# Patient Record
Sex: MALE
[De-identification: names, ages, dates, MRNs, and addresses within clinical notes are randomized per-mention and may not be internally consistent; named-entity substitution may affect disease eponyms.]

---

## 2016-12-30 LAB — HGB BLD-MCNC: 16.5 GM/DL (ref 13–17)

## 2017-01-10 ENCOUNTER — CHARTING TRANS (OUTPATIENT)
Dept: OTHER | Age: 57
End: 2017-01-10

## 2017-01-10 ENCOUNTER — HOSPITAL (OUTPATIENT)
Dept: OTHER | Age: 57
End: 2017-01-10
Attending: ORTHOPAEDIC SURGERY

## 2017-01-11 ENCOUNTER — CHARTING TRANS (OUTPATIENT)
Dept: OTHER | Age: 57
End: 2017-01-11

## 2017-01-20 PROBLEM — Z98.890 S/P LUMBAR MICRODISCECTOMY: Status: ACTIVE | Noted: 2017-01-20

## 2017-02-14 PROBLEM — M51.26 LUMBAR DISC HERNIATION: Status: ACTIVE | Noted: 2017-02-14

## 2021-05-16 ENCOUNTER — HOSPITAL ENCOUNTER (OUTPATIENT)
Age: 61
Discharge: ANOTHER HOSPITAL | End: 2021-05-16
Payer: COMMERCIAL

## 2021-05-16 ENCOUNTER — HOSPITAL ENCOUNTER (EMERGENCY)
Facility: HOSPITAL | Age: 61
Discharge: HOME | End: 2021-05-16
Attending: EMERGENCY MEDICINE
Payer: COMMERCIAL

## 2021-05-16 ENCOUNTER — APPOINTMENT (OUTPATIENT)
Dept: GENERAL RADIOLOGY | Age: 61
End: 2021-05-16
Attending: NURSE PRACTITIONER
Payer: COMMERCIAL

## 2021-05-16 VITALS
SYSTOLIC BLOOD PRESSURE: 122 MMHG | RESPIRATION RATE: 17 BRPM | TEMPERATURE: 98 F | HEART RATE: 81 BPM | DIASTOLIC BLOOD PRESSURE: 86 MMHG | HEIGHT: 71 IN | BODY MASS INDEX: 28 KG/M2 | OXYGEN SATURATION: 97 % | WEIGHT: 200 LBS

## 2021-05-16 VITALS
HEART RATE: 82 BPM | BODY MASS INDEX: 28 KG/M2 | SYSTOLIC BLOOD PRESSURE: 118 MMHG | RESPIRATION RATE: 18 BRPM | WEIGHT: 200 LBS | OXYGEN SATURATION: 99 % | HEIGHT: 71 IN | TEMPERATURE: 99 F | DIASTOLIC BLOOD PRESSURE: 76 MMHG

## 2021-05-16 DIAGNOSIS — S69.92XA INJURY OF FINGER OF LEFT HAND, INITIAL ENCOUNTER: Primary | ICD-10-CM

## 2021-05-16 DIAGNOSIS — L03.012 FELON OF FINGER OF LEFT HAND: Primary | ICD-10-CM

## 2021-05-16 PROCEDURE — 10060 I&D ABSCESS SIMPLE/SINGLE: CPT | Performed by: EMERGENCY MEDICINE

## 2021-05-16 PROCEDURE — 29130 APPL FINGER SPLINT STATIC: CPT | Performed by: EMERGENCY MEDICINE

## 2021-05-16 PROCEDURE — 99283 EMERGENCY DEPT VISIT LOW MDM: CPT | Performed by: EMERGENCY MEDICINE

## 2021-05-16 PROCEDURE — 99204 OFFICE O/P NEW MOD 45 MIN: CPT | Performed by: NURSE PRACTITIONER

## 2021-05-16 PROCEDURE — 73140 X-RAY EXAM OF FINGER(S): CPT | Performed by: NURSE PRACTITIONER

## 2021-05-16 RX ORDER — DOXYCYCLINE HYCLATE 100 MG/1
100 CAPSULE ORAL ONCE
Status: COMPLETED | OUTPATIENT
Start: 2021-05-16 | End: 2021-05-16

## 2021-05-16 RX ORDER — DOXYCYCLINE HYCLATE 100 MG/1
100 CAPSULE ORAL 2 TIMES DAILY
Qty: 28 CAPSULE | Refills: 0 | Status: SHIPPED | OUTPATIENT
Start: 2021-05-16 | End: 2021-05-30

## 2021-05-16 RX ORDER — BUPIVACAINE HYDROCHLORIDE 2.5 MG/ML
20 INJECTION, SOLUTION EPIDURAL; INFILTRATION; INTRACAUDAL ONCE
Status: COMPLETED | OUTPATIENT
Start: 2021-05-16 | End: 2021-05-16

## 2021-05-16 NOTE — ED INITIAL ASSESSMENT (HPI)
Sent from immediate care for left third finger swelling. Seen at immediate care, had metal foreign body removed. Now complaining of worsening swelling. Denies fevers.

## 2021-05-16 NOTE — ED PROVIDER NOTES
Patient Seen in: [facility]    History   CC: finger pain  HPI: Nasir Jesus 62year old male  who presents c/o Left third digit finger pain status post injury 4 days ago in which patient states he was redoing abdiel at his home and be status: Never Smoker      Smokeless tobacco: Never Used    Alcohol use: No    Drug use: No      ROS:  Review of Systems    Positive for stated complaint: finger problem  Other systems are as noted in HPI. Constitutional and vital signs reviewed.       All ed    Follow-up:  No follow-up provider specified.     Medications Prescribed:  Discharge Medication List as of 5/16/2021 12:51 PM

## 2021-05-16 NOTE — ED PROVIDER NOTES
Patient Seen in: [facility] AND [facility] Emergency Department    History   Patient presents with:  Arm or Hand Injury    Stated Complaint: hand injury     HPI    61 yo right hand dominant M without PMH presentng for evaluation of ongoing left third finger pain/s • Lipids Mother    • Heart Disorder Mother    • Lipids Brother    • Cancer Other        Social History    Tobacco Use      Smoking status: Never Smoker      Smokeless tobacco: Never Used    Alcohol use: No    Drug use: No      Review of Systems :  Const proximal phalanx region post removing a metal foreign body 4 days ago. TECHNIQUE: 3 views were obtained. FINDINGS:  BONES: No evidence for acute fracture or dislocation. 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   Tiny ossific DIFFERENTIAL DIAGNOSIS: After history and physical exam differential diagnosis includes but is not limited to felon.     Pulse ox: 97%:Normal on RA, as interpreted by myself    Evaluation for left third finger pain/swelling concerning for felon in immun

## 2021-05-16 NOTE — ED INITIAL ASSESSMENT (HPI)
Pt her to IC with c/o was doing abdiel on Thursday and believes he got some piece of metal form a tool in his finger. On Friday developed redness and swelling to site.  Patient states his finger is tender to touch and painful

## 2021-05-16 NOTE — ED QUICK NOTES
Pt states a few days ago got some metal in his lt hand 3rd digit. States got the piece out. States has swelling now. Went to Immediate care had Xrays done. Was advised to come to ER for draining and antibiotics. Denies fevers.
